# Patient Record
Sex: MALE | Race: BLACK OR AFRICAN AMERICAN | NOT HISPANIC OR LATINO | URBAN - METROPOLITAN AREA
[De-identification: names, ages, dates, MRNs, and addresses within clinical notes are randomized per-mention and may not be internally consistent; named-entity substitution may affect disease eponyms.]

---

## 2017-10-17 ENCOUNTER — OUTPATIENT (OUTPATIENT)
Dept: OUTPATIENT SERVICES | Facility: HOSPITAL | Age: 47
LOS: 1 days | Discharge: HOME | End: 2017-10-17

## 2017-10-17 DIAGNOSIS — M17.9 OSTEOARTHRITIS OF KNEE, UNSPECIFIED: ICD-10-CM

## 2017-10-17 DIAGNOSIS — E55.9 VITAMIN D DEFICIENCY, UNSPECIFIED: ICD-10-CM

## 2017-10-17 DIAGNOSIS — R73.03 PREDIABETES: ICD-10-CM

## 2017-12-12 ENCOUNTER — OUTPATIENT (OUTPATIENT)
Dept: OUTPATIENT SERVICES | Facility: HOSPITAL | Age: 47
LOS: 1 days | Discharge: HOME | End: 2017-12-12

## 2017-12-12 DIAGNOSIS — A69.20 LYME DISEASE, UNSPECIFIED: ICD-10-CM

## 2017-12-12 DIAGNOSIS — M06.9 RHEUMATOID ARTHRITIS, UNSPECIFIED: ICD-10-CM

## 2017-12-12 DIAGNOSIS — D64.9 ANEMIA, UNSPECIFIED: ICD-10-CM

## 2018-06-25 ENCOUNTER — EMERGENCY (EMERGENCY)
Facility: HOSPITAL | Age: 48
LOS: 0 days | Discharge: HOME | End: 2018-06-25
Attending: EMERGENCY MEDICINE | Admitting: EMERGENCY MEDICINE

## 2018-06-25 VITALS
SYSTOLIC BLOOD PRESSURE: 139 MMHG | RESPIRATION RATE: 20 BRPM | DIASTOLIC BLOOD PRESSURE: 74 MMHG | OXYGEN SATURATION: 98 % | TEMPERATURE: 98 F | HEART RATE: 71 BPM

## 2018-06-25 DIAGNOSIS — L03.012 CELLULITIS OF LEFT FINGER: ICD-10-CM

## 2018-06-25 DIAGNOSIS — M79.645 PAIN IN LEFT FINGER(S): ICD-10-CM

## 2018-06-25 NOTE — ED PROVIDER NOTE - NS ED ROS FT
Constitutional: No fevers/chills.    Head: No injury, headache.    Eyes:  No visual changes, eye pain or discharge. No injury.    Cardiac:  No chest pain, SOB or edema. No chest pain with exertion.    Respiratory:  No cough or respiratory distress.     GI:  No nausea, vomiting, diarrhea or abdominal pain. No anorexia. No change in PO intake.    :  No frequency, urgency or burning. No change in urine output.    MS:  No leg pain, leg swelling, myalgia, muscle weakness, or back pain. No loss ROM.    Neuro:  No dizziness or weakness.  No LOC.    Skin:  No skin rash.

## 2018-06-25 NOTE — ED PROVIDER NOTE - PHYSICAL EXAMINATION
GEN: Well appearing, in no apparent distress.    HEAD:  Normocephalic, atraumatic.    EYES:  Clear conjunctivae without injection, drainage or discharge.    NECK:  Supple, no masses. Normal ROM.    CARDIAC:  RRR, normal S1 and S2, no murmurs, rubs or gallops.    RESP:  Respiratory rate and effort appear normal; lungs are clear to auscultation bilaterally; no rhonchi, rales or wheezes.    ABDOMEN:  Soft, non-tender, non-distended, no masses. Normal BS throughout.    MUSCULOSKELETAL: (+) paronychia to 5th left digit with no active drainage or bleeding, (+) erythema and warmth No obvious bony deformity. Tender to palpation. No leg swelling, no calf tenderness. Patient able to ambulate without difficulty.  NEURO:  AAO x 3.     SKIN:  Normal skin color for age and race, well-perfused; warm and dry.

## 2018-06-25 NOTE — ED PROVIDER NOTE - OBJECTIVE STATEMENT
47 yo male with no significant PMHx presents for swelling to tip of left 5th digit. Patient states 3 days ago he had a manicure and since then has been having worsening swelling and pain to 5th left digit. Patient denies fevers, chest pain, SOB, abdominal pain, nausea, vomiting, diarrhea, dizziness, weakness, trauma.

## 2018-06-25 NOTE — ED PROVIDER NOTE - PROGRESS NOTE DETAILS
ATTENDING NOTE:  4-year-old male denies any past medical history, right-hand-dominant, with left pinky finger swelling for the past 2 days.Patient states symptoms started after he had a manicure, symptoms are constant, denies modifying factors, denies fever associated complaints at present. VSS, awake, alert, in NAD, no skin lacerations or abrasions. RUE; no shoulder, elbow, wrist tenderness, hand; appears symmetrical, no gross deformity, crepitus, swelling, tenderness or snuffbox tenderness, Paronychia of medial aspect of left fifth digit nail bed, otherwise no swelling, warmth, erythema, induration, fluctuance, lymphangitis or purulence, NV intact, capillary refill <3 seconds, 2+ radial pulses. ATTENDING NOTE:  48-year-old male denies any past medical history, right-hand-dominant, with left pinky finger swelling for the past 2 days.Patient states symptoms started after he had a manicure, symptoms are constant, denies modifying factors, denies fever associated complaints at present. VSS, awake, alert, in NAD, no skin lacerations or abrasions. RUE; no shoulder, elbow, wrist tenderness, hand; appears symmetrical, no gross deformity, crepitus, swelling, tenderness or snuffbox tenderness, Paronychia of medial aspect of left fifth digit nail bed, otherwise no swelling, warmth, erythema, induration, fluctuance, lymphangitis or purulence, NV intact, capillary refill <3 seconds, 2+ radial pulses.

## 2018-10-02 ENCOUNTER — OUTPATIENT (OUTPATIENT)
Dept: OUTPATIENT SERVICES | Facility: HOSPITAL | Age: 48
LOS: 1 days | Discharge: HOME | End: 2018-10-02

## 2018-10-02 DIAGNOSIS — R73.03 PREDIABETES: ICD-10-CM

## 2018-10-02 DIAGNOSIS — L03.011 CELLULITIS OF RIGHT FINGER: ICD-10-CM

## 2018-11-26 ENCOUNTER — OUTPATIENT (OUTPATIENT)
Dept: OUTPATIENT SERVICES | Facility: HOSPITAL | Age: 48
LOS: 1 days | Discharge: HOME | End: 2018-11-26

## 2018-11-26 DIAGNOSIS — L03.011 CELLULITIS OF RIGHT FINGER: ICD-10-CM

## 2020-11-16 PROBLEM — Z00.00 ENCOUNTER FOR PREVENTIVE HEALTH EXAMINATION: Status: ACTIVE | Noted: 2020-11-16

## 2020-11-18 ENCOUNTER — APPOINTMENT (OUTPATIENT)
Dept: CARDIOLOGY | Facility: CLINIC | Age: 50
End: 2020-11-18
Payer: COMMERCIAL

## 2020-11-18 VITALS — WEIGHT: 228 LBS | TEMPERATURE: 97 F | HEART RATE: 64 BPM | HEIGHT: 73 IN | BODY MASS INDEX: 30.22 KG/M2

## 2020-11-18 VITALS — SYSTOLIC BLOOD PRESSURE: 130 MMHG | DIASTOLIC BLOOD PRESSURE: 80 MMHG | RESPIRATION RATE: 18 BRPM

## 2020-11-18 DIAGNOSIS — Z60.2 PROBLEMS RELATED TO LIVING ALONE: ICD-10-CM

## 2020-11-18 DIAGNOSIS — E78.2 MIXED HYPERLIPIDEMIA: ICD-10-CM

## 2020-11-18 DIAGNOSIS — Z78.9 OTHER SPECIFIED HEALTH STATUS: ICD-10-CM

## 2020-11-18 PROCEDURE — 93000 ELECTROCARDIOGRAM COMPLETE: CPT

## 2020-11-18 PROCEDURE — 99204 OFFICE O/P NEW MOD 45 MIN: CPT

## 2020-11-18 SDOH — SOCIAL STABILITY - SOCIAL INSECURITY: PROBLEMS RELATED TO LIVING ALONE: Z60.2

## 2020-11-18 NOTE — PHYSICAL EXAM
[General Appearance - Well Developed] : well developed [Normal Appearance] : normal appearance [Well Groomed] : well groomed [General Appearance - Well Nourished] : well nourished [No Deformities] : no deformities [General Appearance - In No Acute Distress] : no acute distress [Normal Conjunctiva] : the conjunctiva exhibited no abnormalities [Eyelids - No Xanthelasma] : the eyelids demonstrated no xanthelasmas [Normal Oral Mucosa] : normal oral mucosa [No Oral Pallor] : no oral pallor [No Oral Cyanosis] : no oral cyanosis [Normal Jugular Venous A Waves Present] : normal jugular venous A waves present [Normal Jugular Venous V Waves Present] : normal jugular venous V waves present [No Jugular Venous Hanson A Waves] : no jugular venous hanson A waves [Heart Rate And Rhythm] : heart rate and rhythm were normal [Heart Sounds] : normal S1 and S2 [Systolic grade ___/6] : A grade [unfilled]/6 systolic murmur was heard. [Respiration, Rhythm And Depth] : normal respiratory rhythm and effort [Exaggerated Use Of Accessory Muscles For Inspiration] : no accessory muscle use [Auscultation Breath Sounds / Voice Sounds] : lungs were clear to auscultation bilaterally [Bowel Sounds] : normal bowel sounds [Abdomen Soft] : soft [Abdomen Tenderness] : non-tender [Abdomen Mass (___ Cm)] : no abdominal mass palpated [Abnormal Walk] : normal gait [Gait - Sufficient For Exercise Testing] : the gait was sufficient for exercise testing [Nail Clubbing] : no clubbing of the fingernails [Cyanosis, Localized] : no localized cyanosis [Petechial Hemorrhages (___cm)] : no petechial hemorrhages [Skin Color & Pigmentation] : normal skin color and pigmentation [] : no rash [No Venous Stasis] : no venous stasis [Skin Lesions] : no skin lesions [No Skin Ulcers] : no skin ulcer [No Xanthoma] : no  xanthoma was observed [Oriented To Time, Place, And Person] : oriented to person, place, and time

## 2021-02-10 ENCOUNTER — APPOINTMENT (OUTPATIENT)
Dept: CARDIOLOGY | Facility: CLINIC | Age: 51
End: 2021-02-10

## 2021-02-16 ENCOUNTER — APPOINTMENT (OUTPATIENT)
Dept: CARDIOLOGY | Facility: CLINIC | Age: 51
End: 2021-02-16
Payer: MEDICAID

## 2021-02-16 PROCEDURE — 99072 ADDL SUPL MATRL&STAF TM PHE: CPT

## 2021-02-16 PROCEDURE — 93306 TTE W/DOPPLER COMPLETE: CPT

## 2021-02-16 PROCEDURE — 93015 CV STRESS TEST SUPVJ I&R: CPT

## 2021-02-25 ENCOUNTER — APPOINTMENT (OUTPATIENT)
Dept: CARDIOLOGY | Facility: CLINIC | Age: 51
End: 2021-02-25

## 2021-04-06 ENCOUNTER — APPOINTMENT (OUTPATIENT)
Dept: CARDIOLOGY | Facility: CLINIC | Age: 51
End: 2021-04-06
Payer: MEDICAID

## 2021-04-06 DIAGNOSIS — R73.03 PREDIABETES.: ICD-10-CM

## 2021-04-06 DIAGNOSIS — R07.89 OTHER CHEST PAIN: ICD-10-CM

## 2021-04-06 PROCEDURE — 93351 STRESS TTE COMPLETE: CPT

## 2021-04-06 PROCEDURE — 99072 ADDL SUPL MATRL&STAF TM PHE: CPT

## 2021-04-08 PROBLEM — R07.89 BURNING CHEST PAIN: Status: ACTIVE | Noted: 2020-11-18

## 2021-04-08 PROBLEM — R73.03 PREDIABETES: Status: ACTIVE | Noted: 2020-11-18

## 2021-05-05 ENCOUNTER — APPOINTMENT (OUTPATIENT)
Dept: CARDIOLOGY | Facility: CLINIC | Age: 51
End: 2021-05-05

## 2021-07-26 ENCOUNTER — EMERGENCY (EMERGENCY)
Facility: HOSPITAL | Age: 51
LOS: 0 days | Discharge: HOME | End: 2021-07-27
Attending: EMERGENCY MEDICINE | Admitting: EMERGENCY MEDICINE
Payer: MEDICAID

## 2021-07-26 VITALS
DIASTOLIC BLOOD PRESSURE: 89 MMHG | HEART RATE: 69 BPM | TEMPERATURE: 99 F | WEIGHT: 223.99 LBS | SYSTOLIC BLOOD PRESSURE: 143 MMHG | RESPIRATION RATE: 18 BRPM | OXYGEN SATURATION: 99 %

## 2021-07-26 DIAGNOSIS — R10.11 RIGHT UPPER QUADRANT PAIN: ICD-10-CM

## 2021-07-26 DIAGNOSIS — M54.9 DORSALGIA, UNSPECIFIED: ICD-10-CM

## 2021-07-26 DIAGNOSIS — R73.03 PREDIABETES: ICD-10-CM

## 2021-07-26 DIAGNOSIS — Z86.2 PERSONAL HISTORY OF DISEASES OF THE BLOOD AND BLOOD-FORMING ORGANS AND CERTAIN DISORDERS INVOLVING THE IMMUNE MECHANISM: ICD-10-CM

## 2021-07-26 DIAGNOSIS — E78.5 HYPERLIPIDEMIA, UNSPECIFIED: ICD-10-CM

## 2021-07-26 DIAGNOSIS — M54.5 LOW BACK PAIN: ICD-10-CM

## 2021-07-26 PROCEDURE — 93010 ELECTROCARDIOGRAM REPORT: CPT

## 2021-07-26 PROCEDURE — 99285 EMERGENCY DEPT VISIT HI MDM: CPT

## 2021-07-27 LAB
ALBUMIN SERPL ELPH-MCNC: 4.7 G/DL — SIGNIFICANT CHANGE UP (ref 3.5–5.2)
ALP SERPL-CCNC: 48 U/L — SIGNIFICANT CHANGE UP (ref 30–115)
ALT FLD-CCNC: 35 U/L — SIGNIFICANT CHANGE UP (ref 0–41)
ANION GAP SERPL CALC-SCNC: 11 MMOL/L — SIGNIFICANT CHANGE UP (ref 7–14)
APPEARANCE UR: CLEAR — SIGNIFICANT CHANGE UP
AST SERPL-CCNC: 19 U/L — SIGNIFICANT CHANGE UP (ref 0–41)
BASOPHILS # BLD AUTO: 0.02 K/UL — SIGNIFICANT CHANGE UP (ref 0–0.2)
BASOPHILS NFR BLD AUTO: 0.5 % — SIGNIFICANT CHANGE UP (ref 0–1)
BILIRUB SERPL-MCNC: 0.2 MG/DL — SIGNIFICANT CHANGE UP (ref 0.2–1.2)
BILIRUB UR-MCNC: NEGATIVE — SIGNIFICANT CHANGE UP
BUN SERPL-MCNC: 15 MG/DL — SIGNIFICANT CHANGE UP (ref 10–20)
CALCIUM SERPL-MCNC: 9.4 MG/DL — SIGNIFICANT CHANGE UP (ref 8.5–10.1)
CHLORIDE SERPL-SCNC: 105 MMOL/L — SIGNIFICANT CHANGE UP (ref 98–110)
CO2 SERPL-SCNC: 24 MMOL/L — SIGNIFICANT CHANGE UP (ref 17–32)
COLOR SPEC: SIGNIFICANT CHANGE UP
CREAT SERPL-MCNC: 1.1 MG/DL — SIGNIFICANT CHANGE UP (ref 0.7–1.5)
DIFF PNL FLD: NEGATIVE — SIGNIFICANT CHANGE UP
EOSINOPHIL # BLD AUTO: 0.13 K/UL — SIGNIFICANT CHANGE UP (ref 0–0.7)
EOSINOPHIL NFR BLD AUTO: 3.5 % — SIGNIFICANT CHANGE UP (ref 0–8)
GLUCOSE SERPL-MCNC: 91 MG/DL — SIGNIFICANT CHANGE UP (ref 70–99)
GLUCOSE UR QL: NEGATIVE — SIGNIFICANT CHANGE UP
HCT VFR BLD CALC: 45.5 % — SIGNIFICANT CHANGE UP (ref 42–52)
HGB BLD-MCNC: 14.6 G/DL — SIGNIFICANT CHANGE UP (ref 14–18)
IMM GRANULOCYTES NFR BLD AUTO: 0 % — LOW (ref 0.1–0.3)
KETONES UR-MCNC: NEGATIVE — SIGNIFICANT CHANGE UP
LEUKOCYTE ESTERASE UR-ACNC: NEGATIVE — SIGNIFICANT CHANGE UP
LIDOCAIN IGE QN: 34 U/L — SIGNIFICANT CHANGE UP (ref 7–60)
LYMPHOCYTES # BLD AUTO: 2.01 K/UL — SIGNIFICANT CHANGE UP (ref 1.2–3.4)
LYMPHOCYTES # BLD AUTO: 54.2 % — HIGH (ref 20.5–51.1)
MCHC RBC-ENTMCNC: 27.1 PG — SIGNIFICANT CHANGE UP (ref 27–31)
MCHC RBC-ENTMCNC: 32.1 G/DL — SIGNIFICANT CHANGE UP (ref 32–37)
MCV RBC AUTO: 84.4 FL — SIGNIFICANT CHANGE UP (ref 80–94)
MONOCYTES # BLD AUTO: 0.43 K/UL — SIGNIFICANT CHANGE UP (ref 0.1–0.6)
MONOCYTES NFR BLD AUTO: 11.6 % — HIGH (ref 1.7–9.3)
NEUTROPHILS # BLD AUTO: 1.12 K/UL — LOW (ref 1.4–6.5)
NEUTROPHILS NFR BLD AUTO: 30.2 % — LOW (ref 42.2–75.2)
NITRITE UR-MCNC: NEGATIVE — SIGNIFICANT CHANGE UP
NRBC # BLD: 0 /100 WBCS — SIGNIFICANT CHANGE UP (ref 0–0)
PH UR: 6.5 — SIGNIFICANT CHANGE UP (ref 5–8)
PLATELET # BLD AUTO: 193 K/UL — SIGNIFICANT CHANGE UP (ref 130–400)
POTASSIUM SERPL-MCNC: 4.5 MMOL/L — SIGNIFICANT CHANGE UP (ref 3.5–5)
POTASSIUM SERPL-SCNC: 4.5 MMOL/L — SIGNIFICANT CHANGE UP (ref 3.5–5)
PROT SERPL-MCNC: 7.4 G/DL — SIGNIFICANT CHANGE UP (ref 6–8)
PROT UR-MCNC: NEGATIVE — SIGNIFICANT CHANGE UP
RBC # BLD: 5.39 M/UL — SIGNIFICANT CHANGE UP (ref 4.7–6.1)
RBC # FLD: 13.5 % — SIGNIFICANT CHANGE UP (ref 11.5–14.5)
SODIUM SERPL-SCNC: 140 MMOL/L — SIGNIFICANT CHANGE UP (ref 135–146)
SP GR SPEC: 1.02 — SIGNIFICANT CHANGE UP (ref 1.01–1.03)
TROPONIN T SERPL-MCNC: <0.01 NG/ML — SIGNIFICANT CHANGE UP
TROPONIN T SERPL-MCNC: <0.01 NG/ML — SIGNIFICANT CHANGE UP
UROBILINOGEN FLD QL: SIGNIFICANT CHANGE UP
WBC # BLD: 3.71 K/UL — LOW (ref 4.8–10.8)
WBC # FLD AUTO: 3.71 K/UL — LOW (ref 4.8–10.8)

## 2021-07-27 PROCEDURE — 74177 CT ABD & PELVIS W/CONTRAST: CPT | Mod: 26,MA

## 2021-07-27 PROCEDURE — 71045 X-RAY EXAM CHEST 1 VIEW: CPT | Mod: 26

## 2021-07-27 PROCEDURE — 76705 ECHO EXAM OF ABDOMEN: CPT | Mod: 26

## 2021-07-27 RX ORDER — SODIUM CHLORIDE 9 MG/ML
1000 INJECTION, SOLUTION INTRAVENOUS ONCE
Refills: 0 | Status: COMPLETED | OUTPATIENT
Start: 2021-07-27 | End: 2021-07-27

## 2021-07-27 RX ADMIN — SODIUM CHLORIDE 1000 MILLILITER(S): 9 INJECTION, SOLUTION INTRAVENOUS at 04:10

## 2021-07-27 NOTE — ED ADULT NURSE NOTE - OBJECTIVE STATEMENT
pt. is a 51yr male presenting to the ED for complaints of midsternal chest pain non radiating x3days, pt. denies any fever/chills or SOB.

## 2021-07-27 NOTE — ED PROVIDER NOTE - NS ED ROS FT
Constitutional: No fever, chills, unintended weight loss.  Eyes:  No visual changes, eye pain or discharge.  ENMT:  No hearing changes, pain, no sore throat or runny nose, no difficulty swallowing  Cardiac:  No chest pain, SOB or edema. No chest pain with exertion.  Respiratory:  No cough or respiratory distress. No hemoptysis. No history of asthma or RAD.  GI:  see hpi  :  see hpi  MS:  No myalgia, muscle weakness, joint pain; +R flank pain, +intermitt LBP  Skin:  No skin rash.   Endocrine: No history of thyroid disease, +pre-diabetes.

## 2021-07-27 NOTE — ED PROVIDER NOTE - PHYSICAL EXAMINATION
PE:  nad  skin warm, dry  ncat  neck supple  rrr nl s1s2 no mrg  ctab no wrr  abd soft mild ruq ttp rest non-tender no palpable masses no rgr  back non-tender no cvat  ext no cce dpi  neuro aaox3 grossly nf exam

## 2021-07-27 NOTE — ED PROVIDER NOTE - CARE PROVIDER_API CALL
JUVENTINO SPAULDING  [id number]  [address] [place] [address], [address]  [place] [address]  Phone: [phone]  Fax: [phone]  Established Patient  Follow Up Time: 4-6 Days

## 2021-07-27 NOTE — ED PROVIDER NOTE - OBJECTIVE STATEMENT
51M pmh per emr preDM no meds, hl, lyme disease, anemia, ra, oa p/w R flank/RUQ pain x 3d. Intermitt, radiating along R flank & R lower chest, no specific aggrav/allev factors, no assoc sx. Accomp by RL back pain felt when having a BM. Denies f/c, diaphoresis, sob/elliott, nvd, urinary sx, rash, edema, saddle anesthesia, b/b incontinence, focal numbness or weakness. Pt rpts h/o heavy drinking in past, was dx with "fluid around liver" @ that time, was supposed to go for repeat labs this week. Rpts no heavy drinking since that time, few glasses of wine on weekends. PCP Lorraine.

## 2021-07-27 NOTE — ED PROVIDER NOTE - PATIENT PORTAL LINK FT
You can access the FollowMyHealth Patient Portal offered by [facility] by registering at the following website: [email or web address]. By joining [facility]’s FollowMyHealth portal, you will also be able to view your health information using other applications (apps) compatible with our system.

## 2021-07-27 NOTE — ED PROVIDER NOTE - CLINICAL SUMMARY MEDICAL DECISION MAKING FREE TEXT BOX
Case signed out to me by Dr. Guallpa -- patient presents with 3 days of R flank/RUQ pain with pain radiating to R low back with BM -- no fever, chills, nausea, vomiting.   Labs unremarkable. RUQ US normal, CT scan with only hepatic steatosis.    The patient is feeling well, no further sx. Though etiology of sx unclear, low suspicion for acute intraabdominal infection, obstruction. Cardiac enzymes negative x 2, sx atypical for cardiac ischemia.     Will dc home with PMD follow up, return precautions.

## 2021-07-28 LAB
CULTURE RESULTS: SIGNIFICANT CHANGE UP
SPECIMEN SOURCE: SIGNIFICANT CHANGE UP

## 2024-07-07 ENCOUNTER — EMERGENCY (EMERGENCY)
Facility: HOSPITAL | Age: 54
LOS: 0 days | Discharge: HOME | End: 2024-07-07
Attending: EMERGENCY MEDICINE
Payer: COMMERCIAL

## 2024-07-07 VITALS
RESPIRATION RATE: 18 BRPM | OXYGEN SATURATION: 97 % | HEART RATE: 88 BPM | SYSTOLIC BLOOD PRESSURE: 159 MMHG | TEMPERATURE: 98 F | WEIGHT: 229.94 LBS | DIASTOLIC BLOOD PRESSURE: 99 MMHG
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
## 2024-07-07 DIAGNOSIS — S49.92XA UNSPECIFIED INJURY OF LEFT SHOULDER AND UPPER ARM, INITIAL ENCOUNTER: ICD-10-CM

## 2024-07-07 DIAGNOSIS — S01.111A LACERATION WITHOUT FOREIGN BODY OF RIGHT EYELID AND PERIOCULAR AREA, INITIAL ENCOUNTER: ICD-10-CM
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
## 2024-07-07 DIAGNOSIS — R51.9 HEADACHE, UNSPECIFIED: ICD-10-CM

## 2024-07-07 DIAGNOSIS — S09.90XA UNSPECIFIED INJURY OF HEAD, INITIAL ENCOUNTER: ICD-10-CM

## 2024-07-07 DIAGNOSIS — Z23 ENCOUNTER FOR IMMUNIZATION: ICD-10-CM

## 2024-07-07 PROCEDURE — 99285 EMERGENCY DEPT VISIT HI MDM: CPT | Mod: 25

## 2024-07-07 PROCEDURE — 70486 CT MAXILLOFACIAL W/O DYE: CPT | Mod: 26,MC

## 2024-07-07 PROCEDURE — 73030 X-RAY EXAM OF SHOULDER: CPT | Mod: 26,LT

## 2024-07-07 PROCEDURE — 73030 X-RAY EXAM OF SHOULDER: CPT | Mod: LT

## 2024-07-07 PROCEDURE — 99053 MED SERV 10PM-8AM 24 HR FAC: CPT

## 2024-07-07 PROCEDURE — 70486 CT MAXILLOFACIAL W/O DYE: CPT | Mod: MC

## 2024-07-07 PROCEDURE — 12011 RPR F/E/E/N/L/M 2.5 CM/<: CPT

## 2024-07-07 PROCEDURE — 99284 EMERGENCY DEPT VISIT MOD MDM: CPT | Mod: 25

## 2024-07-07 PROCEDURE — 90471 IMMUNIZATION ADMIN: CPT

## 2024-07-07 PROCEDURE — 90715 TDAP VACCINE 7 YRS/> IM: CPT

## 2024-07-07 RX ORDER — TETANUS TOXOID, REDUCED DIPHTHERIA TOXOID AND ACELLULAR PERTUSSIS VACCINE, ADSORBED 5; 2.5; 8; 8; 2.5 [IU]/.5ML; [IU]/.5ML; UG/.5ML; UG/.5ML; UG/.5ML
0.5 SUSPENSION INTRAMUSCULAR ONCE
Refills: 0 | Status: COMPLETED | OUTPATIENT
Start: 2024-07-07 | End: 2024-07-07

## 2024-07-07 RX ADMIN — Medication 600 MILLIGRAM(S): at 09:46

## 2024-07-07 RX ADMIN — TETANUS TOXOID, REDUCED DIPHTHERIA TOXOID AND ACELLULAR PERTUSSIS VACCINE, ADSORBED 0.5 MILLILITER(S): 5; 2.5; 8; 8; 2.5 SUSPENSION INTRAMUSCULAR at 09:46

## 2024-07-12 ENCOUNTER — EMERGENCY (EMERGENCY)
Facility: HOSPITAL | Age: 54
LOS: 0 days | Discharge: HOME | End: 2024-07-12
Attending: STUDENT IN AN ORGANIZED HEALTH CARE EDUCATION/TRAINING PROGRAM
Payer: COMMERCIAL

## 2024-07-12 VITALS
OXYGEN SATURATION: 100 % | DIASTOLIC BLOOD PRESSURE: 97 MMHG | HEART RATE: 71 BPM | WEIGHT: 229.94 LBS | RESPIRATION RATE: 18 BRPM | TEMPERATURE: 99 F | HEIGHT: 73 IN | SYSTOLIC BLOOD PRESSURE: 149 MMHG

## 2024-07-12 DIAGNOSIS — S01.111D LACERATION WITHOUT FOREIGN BODY OF RIGHT EYELID AND PERIOCULAR AREA, SUBSEQUENT ENCOUNTER: ICD-10-CM

## 2024-07-12 PROCEDURE — L9995: CPT

## 2024-07-12 PROCEDURE — 99212 OFFICE O/P EST SF 10 MIN: CPT

## 2024-07-22 ENCOUNTER — APPOINTMENT (OUTPATIENT)
Dept: PAIN MANAGEMENT | Facility: CLINIC | Age: 54
End: 2024-07-22

## 2024-07-22 DIAGNOSIS — M25.512 PAIN IN LEFT SHOULDER: ICD-10-CM

## 2024-07-22 DIAGNOSIS — S46.912A STRAIN OF UNSPECIFIED MUSCLE, FASCIA AND TENDON AT SHOULDER AND UPPER ARM LEVEL, LEFT ARM, INITIAL ENCOUNTER: ICD-10-CM

## 2024-07-22 PROCEDURE — 99205 OFFICE O/P NEW HI 60 MIN: CPT | Mod: ACP

## 2024-07-22 RX ORDER — DICLOFENAC SODIUM 75 MG/1
75 TABLET, DELAYED RELEASE ORAL TWICE DAILY
Qty: 60 | Refills: 1 | Status: ACTIVE | COMMUNITY
Start: 2024-07-22 | End: 1900-01-01

## 2024-07-22 NOTE — DISCUSSION/SUMMARY
[Medication Risks Reviewed] : Medication risks reviewed [de-identified] : 54 y.old male patient who sustained injury to his left shoulder at work on 07/07/2024. There is painfully decreased  ROM which affects his ADLs and quality of life. We will request Left shoulder X-ray to r/o any pathology. We will start patient on Physical Therapy. We will start patient on Diclofenac Sodium 75mg BID for pain control. We will re-evaluate in 6 weeks.   Physical therapy of the Left shoulder  2-3 times a week for 6-8 weeks stressing a home exercise program of walking, shoulder griddle strengthening, swimming, elliptical , recumbent bike, Ronnie chi and Yoga. Use things that heat like hot shower or icy heat before rehab, exercising and at the beginning of the day, and ice (ice in a bag never directly on the skin) after activity and at the end of the day.  Total clinician time spent today on the patient is 60 minutes including preparing to see the patient, obtaining and/or reviewing and confirming history, performing medically necessary and appropriate examination, counseling and educating the patient and/or family, documenting clinical information in the EHR and communicating and/or referring to other healthcare professionals.  IAN Olivares, DO

## 2024-07-22 NOTE — PHYSICAL EXAM
[Normal Coordination] : normal coordination [Normal DTR UE/LE] : normal DTR UE/LE  [Normal Sensation] : normal sensation [Normal Mood and Affect] : normal mood and affect [Oriented] : oriented [Able to Communicate] : able to communicate [Normal Skin] : normal skin [No Rash] : no rash [No Ulcers] : no ulcers [No Lesions] : no lesions [No obvious lymphadenopathy in areas examined] : no obvious lymphadenopathy in areas examined [Well Developed] : well developed [Well Nourished] : well nourished [Peripheral vascular exam is grossly normal] : peripheral vascular exam is grossly normal [No Respiratory Distress] : no respiratory distress [Normal Bowel Sounds] : normal bowel sounds [Left] : left shoulder [Standing] : standing [Severe] : severe [4 ___] : forward flexion 4[unfilled]/5 [4___] : internal rotation 4[unfilled]/5 [FreeTextEntry3] : on the right brow after stitches [] : no pain with adduction [TWNoteComboBox7] : active forward flexion 90 degrees [TWNoteComboBox4] : passive forward flexion 95 degrees [de-identified] : active abduction 90 degrees [TWNoteComboBox9] : passive abduction 95 degrees [TWNoteComboBox6] : internal rotation 50 degrees [de-identified] : external rotation 50 degrees

## 2024-07-22 NOTE — HISTORY OF PRESENT ILLNESS
[Work related] : work related [Sudden] : sudden [9] : 9 [8] : 8 [Squeezing] : squeezing [Constant] : constant [Household chores] : household chores [Leisure] : leisure [Sleep] : sleep [Not working due to injury] : Work status: not working due to injury [] : This patient has had an injection before: no [FreeTextEntry1] : LEFT SHOULDER [FreeTextEntry3] : 7/7/2024 [de-identified] : LEANING ON LEFT SHOULDER  [de-identified] : EMERGENCY ROOM

## 2024-07-22 NOTE — WORK
[Sprain/Strain] : sprain/strain [Other: ___] : [unfilled] [Was the competent medical cause of the injury] : was the competent medical cause of the injury [Are consistent with the injury] : are consistent with the injury [Consistent with my objective findings] : consistent with my objective findings [Total (100%)] : total (100%) [Does not reveal pre-existing condition(s) that may affect treatment/prognosis] : does not reveal pre-existing condition(s) that may affect treatment/prognosis [Cannot return to work because ________] : cannot return to work because [unfilled] [Lifting] : lifting [Pulling/Pushing] : pulling/pushing [Simple grasping] : simple grasping [Reaching overhead] : reaching overhead [Use of upper extremities] : use of upper extremities [Unknown at this time] : : unknown at this time [Patient] : patient [No Rx restrictions] : No Rx restrictions. [I provided the services listed above] :  I provided the services listed above. [Less than Sedentary Work:] :  Less than Sedentary Work: Unable to meet the requirement of Sedentary Work. 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

## 2024-09-03 ENCOUNTER — APPOINTMENT (OUTPATIENT)
Dept: PAIN MANAGEMENT | Facility: CLINIC | Age: 54
End: 2024-09-03
Payer: OTHER MISCELLANEOUS

## 2024-09-03 DIAGNOSIS — M75.90 SHOULDER LESION, UNSPECIFIED, UNSPECIFIED SHOULDER: ICD-10-CM

## 2024-09-03 DIAGNOSIS — M25.512 PAIN IN LEFT SHOULDER: ICD-10-CM

## 2024-09-03 DIAGNOSIS — M75.52 BURSITIS OF LEFT SHOULDER: ICD-10-CM

## 2024-09-03 PROCEDURE — 99204 OFFICE O/P NEW MOD 45 MIN: CPT

## 2024-09-03 RX ORDER — MELOXICAM 15 MG/1
15 TABLET ORAL DAILY
Qty: 30 | Refills: 0 | Status: ACTIVE | COMMUNITY
Start: 2024-09-03 | End: 1900-01-01

## 2024-09-03 NOTE — HISTORY OF PRESENT ILLNESS
[Work related] : work related [Sudden] : sudden [9] : 9 [8] : 8 [Squeezing] : squeezing [Constant] : constant [Household chores] : household chores [Leisure] : leisure [Sleep] : sleep [Not working due to injury] : Work status: not working due to injury [] : This patient has had an injection before: no [FreeTextEntry1] : LEFT SHOULDER [FreeTextEntry3] : 7/7/2024 [de-identified] : LEANING ON LEFT SHOULDER  [de-identified] : EMERGENCY ROOM

## 2024-09-03 NOTE — PHYSICAL EXAM
[Left] : left shoulder [de-identified] : Sagar +LT, pain with resisted abduction L side [FreeTextEntry3] : on the right brow after stitches [TWNoteComboBox7] : active forward flexion 90 degrees [TWNoteComboBox4] : passive forward flexion 95 degrees [de-identified] : active abduction 90 degrees [TWNoteComboBox9] : passive abduction 95 degrees [TWNoteComboBox6] : internal rotation 50 degrees [de-identified] : external rotation 50 degrees

## 2024-09-03 NOTE — DISCUSSION/SUMMARY
[Medication Risks Reviewed] : Medication risks reviewed [de-identified] : 54-year-old male patient who sustained injury to his left shoulder at work on 07/07/2024. There is painfully decreased ROM which affects his ADLs and quality of life. We will start patient on Physical Therapy. We will start patient on Meloxicam 15mg daily for 4 weeks. Discussed risks and benefits. Avoid taking for any side effects. Patient is aware to take for 2 weeks straight than take 1 week off before repeating.  -MRI of the left shoulder ordered to rule out any internal derangement.  -Physical therapy of the Left shoulder 2-3 times a week for 6-8 weeks stressing a home exercise program of walking, shoulder griddle strengthening, swimming, elliptical , recumbent bike, Ronnie chi and Yoga. Use things that heat like hot shower or icy heat before rehab, exercising and at the beginning of the day, and ice (ice in a bag never directly on the skin) after activity and at the end of the day.  -Participation in active HEP was discussed and printed.  Follow up after imaging studies for further recommendations.  I, Jericho Bhatti, attest that this documentation has been prepared under the direction and in the presence of Provider Dell Dickey, DO The documentation recorded by the scribe, in my presence, accurately reflects the service I personally performed, and the decisions made by me with my edits as appropriate.   Best Regards, Dell Dickey D.O.

## 2024-10-01 ENCOUNTER — APPOINTMENT (OUTPATIENT)
Dept: PAIN MANAGEMENT | Facility: CLINIC | Age: 54
End: 2024-10-01
Payer: OTHER MISCELLANEOUS

## 2024-10-01 DIAGNOSIS — M75.52 BURSITIS OF LEFT SHOULDER: ICD-10-CM

## 2024-10-01 DIAGNOSIS — M75.90 SHOULDER LESION, UNSPECIFIED, UNSPECIFIED SHOULDER: ICD-10-CM

## 2024-10-01 DIAGNOSIS — M25.512 PAIN IN LEFT SHOULDER: ICD-10-CM

## 2024-10-01 PROCEDURE — 99213 OFFICE O/P EST LOW 20 MIN: CPT

## 2024-10-01 NOTE — HISTORY OF PRESENT ILLNESS
[Work related] : work related [Sudden] : sudden [9] : 9 [8] : 8 [Squeezing] : squeezing [Constant] : constant [Household chores] : household chores [Leisure] : leisure [Sleep] : sleep [Not working due to injury] : Work status: not working due to injury [] : This patient has had an injection before: no [FreeTextEntry1] : LEFT SHOULDER [FreeTextEntry3] : 7/7/2024 [de-identified] : LEANING ON LEFT SHOULDER  [de-identified] : EMERGENCY ROOM

## 2024-10-01 NOTE — PHYSICAL EXAM
[Left] : left shoulder [de-identified] : Sagar +LT, pain with resisted abduction L side [FreeTextEntry3] : on the right brow after stitches [TWNoteComboBox7] : active forward flexion 90 degrees [TWNoteComboBox4] : passive forward flexion 95 degrees [de-identified] : active abduction 90 degrees [TWNoteComboBox9] : passive abduction 95 degrees [TWNoteComboBox6] : internal rotation 50 degrees [de-identified] : external rotation 50 degrees

## 2024-10-01 NOTE — DISCUSSION/SUMMARY
[Medication Risks Reviewed] : Medication risks reviewed [de-identified] : 54-year-old male patient who sustained injury to his left shoulder at work on 07/07/2024. There is painfully decreased ROM which affects his ADLs and quality of life. We will start patient on Physical Therapy. We will start patient on Meloxicam 15mg daily for 4 weeks. Discussed risks and benefits. Avoid taking for any side effects. Patient is aware to take for 2 weeks straight than take 1 week off before repeating.  -MRI of the left shoulder ordered to rule out any internal derangement.  -Physical therapy of the Left shoulder 2-3 times a week for 6-8 weeks stressing a home exercise program of walking, shoulder griddle strengthening, swimming, elliptical , recumbent bike, Ronnie chi and Yoga. Use things that heat like hot shower or icy heat before rehab, exercising and at the beginning of the day, and ice (ice in a bag never directly on the skin) after activity and at the end of the day.  -Participation in active HEP was discussed and printed.  Follow up after imaging studies for further recommendations.  I, Jericho Bhatti, attest that this documentation has been prepared under the direction and in the presence of Provider Dell Dickey, DO The documentation recorded by the scribe, in my presence, accurately reflects the service I personally performed, and the decisions made by me with my edits as appropriate.   Best Regards, Dell Dickey D.O.

## 2024-10-15 ENCOUNTER — APPOINTMENT (OUTPATIENT)
Dept: PAIN MANAGEMENT | Facility: CLINIC | Age: 54
End: 2024-10-15
Payer: OTHER MISCELLANEOUS

## 2024-10-15 DIAGNOSIS — M25.512 PAIN IN LEFT SHOULDER: ICD-10-CM

## 2024-10-15 DIAGNOSIS — M75.52 BURSITIS OF LEFT SHOULDER: ICD-10-CM

## 2024-10-15 DIAGNOSIS — M75.90 SHOULDER LESION, UNSPECIFIED, UNSPECIFIED SHOULDER: ICD-10-CM

## 2024-10-15 DIAGNOSIS — S43.432D SUPERIOR GLENOID LABRUM LESION OF LEFT SHOULDER, SUBSEQUENT ENCOUNTER: ICD-10-CM

## 2024-10-15 PROCEDURE — 99214 OFFICE O/P EST MOD 30 MIN: CPT

## 2024-11-05 ENCOUNTER — APPOINTMENT (OUTPATIENT)
Dept: PAIN MANAGEMENT | Facility: CLINIC | Age: 54
End: 2024-11-05
Payer: OTHER MISCELLANEOUS

## 2024-11-05 DIAGNOSIS — M75.52 BURSITIS OF LEFT SHOULDER: ICD-10-CM

## 2024-11-05 PROCEDURE — 99214 OFFICE O/P EST MOD 30 MIN: CPT

## 2024-11-07 ENCOUNTER — APPOINTMENT (OUTPATIENT)
Dept: ORTHOPEDIC SURGERY | Facility: CLINIC | Age: 54
End: 2024-11-07
Payer: OTHER MISCELLANEOUS

## 2024-11-07 DIAGNOSIS — M25.512 PAIN IN LEFT SHOULDER: ICD-10-CM

## 2024-11-07 PROCEDURE — 99204 OFFICE O/P NEW MOD 45 MIN: CPT

## 2024-11-07 PROCEDURE — 73030 X-RAY EXAM OF SHOULDER: CPT | Mod: LT

## 2024-12-17 ENCOUNTER — APPOINTMENT (OUTPATIENT)
Dept: PAIN MANAGEMENT | Facility: CLINIC | Age: 54
End: 2024-12-17
Payer: OTHER MISCELLANEOUS

## 2024-12-17 DIAGNOSIS — M25.512 PAIN IN LEFT SHOULDER: ICD-10-CM

## 2024-12-17 DIAGNOSIS — M75.52 BURSITIS OF LEFT SHOULDER: ICD-10-CM

## 2024-12-17 PROCEDURE — 99214 OFFICE O/P EST MOD 30 MIN: CPT

## 2024-12-17 RX ORDER — LIDOCAINE 5% 700 MG/1
5 PATCH TOPICAL
Qty: 60 | Refills: 0 | Status: ACTIVE | COMMUNITY
Start: 2024-12-17 | End: 1900-01-01

## 2025-03-03 ENCOUNTER — OUTPATIENT (OUTPATIENT)
Dept: OUTPATIENT SERVICES | Facility: HOSPITAL | Age: 55
LOS: 1 days | End: 2025-03-03
Payer: COMMERCIAL

## 2025-03-03 VITALS
DIASTOLIC BLOOD PRESSURE: 82 MMHG | OXYGEN SATURATION: 100 % | WEIGHT: 227.96 LBS | RESPIRATION RATE: 16 BRPM | HEART RATE: 65 BPM | SYSTOLIC BLOOD PRESSURE: 121 MMHG | TEMPERATURE: 98 F | HEIGHT: 73 IN

## 2025-03-03 DIAGNOSIS — M75.102 UNSPECIFIED ROTATOR CUFF TEAR OR RUPTURE OF LEFT SHOULDER, NOT SPECIFIED AS TRAUMATIC: ICD-10-CM

## 2025-03-03 DIAGNOSIS — Z01.818 ENCOUNTER FOR OTHER PREPROCEDURAL EXAMINATION: ICD-10-CM

## 2025-03-03 LAB
ALBUMIN SERPL ELPH-MCNC: 4.3 G/DL — SIGNIFICANT CHANGE UP (ref 3.5–5.2)
ALP SERPL-CCNC: 42 U/L — SIGNIFICANT CHANGE UP (ref 30–115)
ALT FLD-CCNC: 28 U/L — SIGNIFICANT CHANGE UP (ref 0–41)
ANION GAP SERPL CALC-SCNC: 12 MMOL/L — SIGNIFICANT CHANGE UP (ref 7–14)
AST SERPL-CCNC: 17 U/L — SIGNIFICANT CHANGE UP (ref 0–41)
BASOPHILS # BLD AUTO: 0.03 K/UL — SIGNIFICANT CHANGE UP (ref 0–0.2)
BASOPHILS NFR BLD AUTO: 0.9 % — SIGNIFICANT CHANGE UP (ref 0–1)
BILIRUB SERPL-MCNC: 0.3 MG/DL — SIGNIFICANT CHANGE UP (ref 0.2–1.2)
BUN SERPL-MCNC: 14 MG/DL — SIGNIFICANT CHANGE UP (ref 10–20)
CALCIUM SERPL-MCNC: 9.3 MG/DL — SIGNIFICANT CHANGE UP (ref 8.4–10.5)
CHLORIDE SERPL-SCNC: 105 MMOL/L — SIGNIFICANT CHANGE UP (ref 98–110)
CO2 SERPL-SCNC: 23 MMOL/L — SIGNIFICANT CHANGE UP (ref 17–32)
CREAT SERPL-MCNC: 0.9 MG/DL — SIGNIFICANT CHANGE UP (ref 0.7–1.5)
EGFR: 101 ML/MIN/1.73M2 — SIGNIFICANT CHANGE UP
EGFR: 101 ML/MIN/1.73M2 — SIGNIFICANT CHANGE UP
EOSINOPHIL # BLD AUTO: 0.11 K/UL — SIGNIFICANT CHANGE UP (ref 0–0.7)
EOSINOPHIL NFR BLD AUTO: 3.2 % — SIGNIFICANT CHANGE UP (ref 0–8)
GLUCOSE SERPL-MCNC: 116 MG/DL — HIGH (ref 70–99)
HCT VFR BLD CALC: 43.4 % — SIGNIFICANT CHANGE UP (ref 42–52)
HGB BLD-MCNC: 14 G/DL — SIGNIFICANT CHANGE UP (ref 14–18)
IMM GRANULOCYTES NFR BLD AUTO: 0.3 % — SIGNIFICANT CHANGE UP (ref 0.1–0.3)
LYMPHOCYTES # BLD AUTO: 1.53 K/UL — SIGNIFICANT CHANGE UP (ref 1.2–3.4)
LYMPHOCYTES # BLD AUTO: 45 % — SIGNIFICANT CHANGE UP (ref 20.5–51.1)
MCHC RBC-ENTMCNC: 26.8 PG — LOW (ref 27–31)
MCHC RBC-ENTMCNC: 32.3 G/DL — SIGNIFICANT CHANGE UP (ref 32–37)
MCV RBC AUTO: 83 FL — SIGNIFICANT CHANGE UP (ref 80–94)
MONOCYTES # BLD AUTO: 0.41 K/UL — SIGNIFICANT CHANGE UP (ref 0.1–0.6)
MONOCYTES NFR BLD AUTO: 12.1 % — HIGH (ref 1.7–9.3)
NEUTROPHILS # BLD AUTO: 1.31 K/UL — LOW (ref 1.4–6.5)
NEUTROPHILS NFR BLD AUTO: 38.5 % — LOW (ref 42.2–75.2)
NRBC BLD AUTO-RTO: 0 /100 WBCS — SIGNIFICANT CHANGE UP (ref 0–0)
PLATELET # BLD AUTO: 206 K/UL — SIGNIFICANT CHANGE UP (ref 130–400)
PMV BLD: 11 FL — HIGH (ref 7.4–10.4)
POTASSIUM SERPL-MCNC: 4.2 MMOL/L — SIGNIFICANT CHANGE UP (ref 3.5–5)
POTASSIUM SERPL-SCNC: 4.2 MMOL/L — SIGNIFICANT CHANGE UP (ref 3.5–5)
PROT SERPL-MCNC: 7.4 G/DL — SIGNIFICANT CHANGE UP (ref 6–8)
RBC # BLD: 5.23 M/UL — SIGNIFICANT CHANGE UP (ref 4.7–6.1)
RBC # FLD: 14.2 % — SIGNIFICANT CHANGE UP (ref 11.5–14.5)
SODIUM SERPL-SCNC: 140 MMOL/L — SIGNIFICANT CHANGE UP (ref 135–146)
WBC # BLD: 3.4 K/UL — LOW (ref 4.8–10.8)
WBC # FLD AUTO: 3.4 K/UL — LOW (ref 4.8–10.8)

## 2025-03-03 PROCEDURE — 80053 COMPREHEN METABOLIC PANEL: CPT

## 2025-03-03 PROCEDURE — 99214 OFFICE O/P EST MOD 30 MIN: CPT | Mod: 25

## 2025-03-03 PROCEDURE — 85025 COMPLETE CBC W/AUTO DIFF WBC: CPT

## 2025-03-03 PROCEDURE — 36415 COLL VENOUS BLD VENIPUNCTURE: CPT

## 2025-03-03 NOTE — H&P PST ADULT - REASON FOR ADMISSION
Case Type: OP Block TimeSuite: CASProceduralist: Eliezer Franco  Confirmed Surgery DateTime: 03- - 0:00PAST DateTime: 03- - 9:30Procedure: LEFT SHOULDER ARTHROSCOPY, ROTATOR CUFF REPAIR, DECOMPRESSION, DISTAL CLAVICLE EXCISION  ERP?: UnavailableLaterality: LeftLength of Procedure: 90 Minutes  Anesthesia Type: General

## 2025-03-03 NOTE — H&P PST ADULT - HISTORY OF PRESENT ILLNESS
54y Male presents today for presurgical testing for LEFT SHOULDER ARTHROSCOPY, ROTATOR CUFF REPAIR, DECOMPRESSION, DISTAL CLAVICLE EXCISION. Per Ortho noted dated 11/7/24 "Patient is here for evaluation of left shoulder pain  Affecting quality of life  Has pain and weakness with loss of rom  Wakes up at night due to pain  ?  WC injury"  Patient states above is true and accurate. He states that his pain is currently 5/10 on pain scale, sharp and stabbing in nature, occurring constantly, localized to left shoulder. He states pain is disrupting his sleep, is and is mildly relieved with physical therapy, and use of ice/massage. He endorses marked decrease in ROM of his left arm and increase in weakness since the injury occurred 7/7/24. He offers no other complaints at this time, now for scheduled procedure.   Patient/guardian denies any CP, palpitations, SOB, cough, or dysuria. No recent URI or UTI.  Stated exercise tolerance is FOS 5  ANNEL screen reviewed    Patient/guardian denies any recent personal exposure to COVID19. Denies any sick contacts. Patient/guardian denies travel within the past 30 days. Patient was instructed to quarantine until after procedure.    Anesthesia Alert  YES--Difficult Airway - Class IV  NO--History of neck surgery or radiation  NO--Limited ROM of neck  NO--History of Malignant hyperthermia  NO--Personal or family history of Pseudocholinesterase deficiency.  NO--Prior Anesthesia Complication  NO--Latex Allergy  NO--Loose teeth - UPPER FLIPPER  NO--History of Rheumatoid Arthritis  NO--ANNEL  NO--Bleeding risk  NO--Other_____    [employer] Activity Status Index (DASI) from [employer]  on 3/3/2025  ** All calculations should be rechecked by clinician prior to use **    RESULT SUMMARY:  26 points  The higher the score (maximum 58.2), the higher the functional status.    5.94 METs        INPUTS:  Take care of self —> 2.75 = Yes  Walk indoors —> 1.75 = Yes  Walk 1&ndash;2 blocks on level ground —> 2.75 = Yes  Climb a flight of stairs or walk up a hill —> 5.5 = Yes  Run a short distance —> 8 = Yes  Do light work around the house —> 0 = No  Do moderate work around the house —> 0 = No  Do heavy work around the house —> 0 = No  Do yardwork —> 0 = No  Have sexual relations —> 5.25 = Yes  Participate in moderate recreational activities —> 0 = No  Participate in strenuous sports —> 0 = No    Revised Cardiac Risk Index for Pre-Operative Risk from [email or web address]  on 3/3/2025  ** All calculations should be rechecked by clinician prior to use **    RESULT SUMMARY:  0 points  RCRI Score    3.9 %  Risk of major cardiac event      INPUTS:  High-risk surgery —> 0 = No  History of ischemic heart disease —> 0 = No  History of congestive heart failure —> 0 = No  History of cerebrovascular disease —> 0 = No  Pre-operative treatment with insulin —> 0 = No  Pre-operative creatinine >2 mg/dL / 176.8 µmol/L —> 0 = No    Patient/guardian states that this is their complete medical history and list of medications.  Patient/guardian understands instructions given during this visit and was given the opportunity to ask questions and have them answered. They were instructed to follow up with their surgeon/surgeon's office with any questions regarding their procedure.

## 2025-03-04 ENCOUNTER — APPOINTMENT (OUTPATIENT)
Dept: PAIN MANAGEMENT | Facility: CLINIC | Age: 55
End: 2025-03-04
Payer: OTHER MISCELLANEOUS

## 2025-03-04 DIAGNOSIS — M75.102 UNSPECIFIED ROTATOR CUFF TEAR OR RUPTURE OF LEFT SHOULDER, NOT SPECIFIED AS TRAUMATIC: ICD-10-CM

## 2025-03-04 DIAGNOSIS — M25.512 PAIN IN LEFT SHOULDER: ICD-10-CM

## 2025-03-04 DIAGNOSIS — Z01.818 ENCOUNTER FOR OTHER PREPROCEDURAL EXAMINATION: ICD-10-CM

## 2025-03-04 DIAGNOSIS — M75.90 SHOULDER LESION, UNSPECIFIED, UNSPECIFIED SHOULDER: ICD-10-CM

## 2025-03-04 PROBLEM — M75.100 UNSPECIFIED ROTATOR CUFF TEAR OR RUPTURE OF UNSPECIFIED SHOULDER, NOT SPECIFIED AS TRAUMATIC: Chronic | Status: ACTIVE | Noted: 2025-03-03

## 2025-03-04 PROCEDURE — 99213 OFFICE O/P EST LOW 20 MIN: CPT

## 2025-03-24 ENCOUNTER — APPOINTMENT (OUTPATIENT)
Dept: ORTHOPEDIC SURGERY | Facility: AMBULATORY SURGERY CENTER | Age: 55
End: 2025-03-24

## 2025-04-01 ENCOUNTER — APPOINTMENT (OUTPATIENT)
Dept: ORTHOPEDIC SURGERY | Facility: CLINIC | Age: 55
End: 2025-04-01

## 2025-04-15 ENCOUNTER — APPOINTMENT (OUTPATIENT)
Dept: PAIN MANAGEMENT | Facility: CLINIC | Age: 55
End: 2025-04-15
Payer: OTHER MISCELLANEOUS

## 2025-04-15 DIAGNOSIS — M75.52 BURSITIS OF LEFT SHOULDER: ICD-10-CM

## 2025-04-15 DIAGNOSIS — M25.512 PAIN IN LEFT SHOULDER: ICD-10-CM

## 2025-04-15 PROCEDURE — 99214 OFFICE O/P EST MOD 30 MIN: CPT

## 2025-06-17 ENCOUNTER — APPOINTMENT (OUTPATIENT)
Dept: PAIN MANAGEMENT | Facility: CLINIC | Age: 55
End: 2025-06-17
Payer: OTHER MISCELLANEOUS

## 2025-06-17 PROCEDURE — 99214 OFFICE O/P EST MOD 30 MIN: CPT

## 2025-08-26 ENCOUNTER — APPOINTMENT (OUTPATIENT)
Dept: PAIN MANAGEMENT | Facility: CLINIC | Age: 55
End: 2025-08-26
Payer: OTHER MISCELLANEOUS

## 2025-08-26 DIAGNOSIS — M75.52 BURSITIS OF LEFT SHOULDER: ICD-10-CM

## 2025-08-26 DIAGNOSIS — M75.90 SHOULDER LESION, UNSPECIFIED, UNSPECIFIED SHOULDER: ICD-10-CM

## 2025-08-26 PROCEDURE — 99214 OFFICE O/P EST MOD 30 MIN: CPT
